# Patient Record
Sex: MALE | Race: OTHER | HISPANIC OR LATINO | URBAN - METROPOLITAN AREA
[De-identification: names, ages, dates, MRNs, and addresses within clinical notes are randomized per-mention and may not be internally consistent; named-entity substitution may affect disease eponyms.]

---

## 2023-06-19 ENCOUNTER — HOSPITAL ENCOUNTER (EMERGENCY)
Facility: HOSPITAL | Age: 33
Discharge: HOME | End: 2023-06-20
Attending: EMERGENCY MEDICINE

## 2023-06-19 VITALS
WEIGHT: 200.84 LBS | DIASTOLIC BLOOD PRESSURE: 65 MMHG | TEMPERATURE: 98.3 F | RESPIRATION RATE: 17 BRPM | SYSTOLIC BLOOD PRESSURE: 99 MMHG | OXYGEN SATURATION: 98 % | HEART RATE: 73 BPM

## 2023-06-19 DIAGNOSIS — M54.31 SCIATICA OF RIGHT SIDE: Primary | ICD-10-CM

## 2023-06-19 PROCEDURE — 99282 EMERGENCY DEPT VISIT SF MDM: CPT

## 2023-06-20 PROCEDURE — 96372 THER/PROPH/DIAG INJ SC/IM: CPT

## 2023-06-20 RX ORDER — DIAZEPAM 2 MG/1
2 TABLET ORAL ONCE
Status: COMPLETED | OUTPATIENT
Start: 2023-06-20 | End: 2023-06-20

## 2023-06-20 RX ORDER — KETOROLAC TROMETHAMINE 30 MG/ML
15 INJECTION, SOLUTION INTRAMUSCULAR; INTRAVENOUS ONCE
Status: COMPLETED | OUTPATIENT
Start: 2023-06-20 | End: 2023-06-20

## 2023-06-20 RX ORDER — METHOCARBAMOL 500 MG/1
500 TABLET, FILM COATED ORAL 2 TIMES DAILY
Qty: 20 TABLET | Refills: 0 | Status: SHIPPED | OUTPATIENT
Start: 2023-06-20

## 2023-06-20 RX ORDER — METHOCARBAMOL 500 MG/1
500 TABLET, FILM COATED ORAL ONCE
Status: COMPLETED | OUTPATIENT
Start: 2023-06-20 | End: 2023-06-20

## 2023-06-20 RX ADMIN — DIAZEPAM 2 MG: 2 TABLET ORAL at 00:38

## 2023-06-20 RX ADMIN — METHOCARBAMOL 500 MG: 500 TABLET ORAL at 00:38

## 2023-06-20 RX ADMIN — KETOROLAC TROMETHAMINE 15 MG: 30 INJECTION, SOLUTION INTRAMUSCULAR; INTRAVENOUS at 00:37

## 2023-06-20 NOTE — DISCHARGE INSTRUCTIONS
Your symptoms are likely due to a muscle strain / sciatica  Take ibuprofen and acetaminophen as needed for pain relief  You can alternate between these every 3 hours  You can also take Robaxin (muscle relaxer) 2 times each day as needed  Be aware this may make you drowsy  Please return to the ER for worsening symptoms, such as sudden incontinence or if you are unable to walk due to weakness

## 2023-06-20 NOTE — ED PROVIDER NOTES
"History  Chief Complaint   Patient presents with   • Leg Pain     Pt c/o right leg pain, that starts at the lower back and radiates down leg  States \"unsure if it is sciatica or not but it really hurts and it is hard to walk\"  Denies injury     58-year-old male presents for evaluation of low back pain radiating down his right leg  States this began gradually over the last several days, exacerbated with movement, able to ambulate but causes significant pain  Has tried acetaminophen and lidocaine patch without much relief  Does not have a known history of sciatica  States he was cleaning a lot over the weekend but no other obvious injury or trauma  Denies testicular/penile pain, penile discharge, dysuria; denies bowel/bladder incontinence, urinary retention, weakness, paresthesias, anesthesia; denies fever, chills, nausea, vomiting  None       No past medical history on file  Past Surgical History:   Procedure Laterality Date   • APPENDECTOMY      10 years ago       No family history on file  I have reviewed and agree with the history as documented  E-Cigarette/Vaping     E-Cigarette/Vaping Substances     Social History     Tobacco Use   • Smoking status: Never   • Smokeless tobacco: Never   Substance Use Topics   • Alcohol use: Not Currently   • Drug use: Never       Review of Systems   Constitutional: Negative for chills and fever  HENT: Negative for ear pain and sore throat  Eyes: Negative for pain and visual disturbance  Respiratory: Negative for cough and shortness of breath  Cardiovascular: Negative for chest pain and palpitations  Gastrointestinal: Negative for abdominal pain and vomiting  Genitourinary: Negative for dysuria and hematuria  Musculoskeletal: Positive for back pain and myalgias  Negative for arthralgias  Skin: Negative for color change and rash  Neurological: Negative for seizures and syncope  All other systems reviewed and are negative        Physical " Exam  Physical Exam  Vitals and nursing note reviewed  Constitutional:       General: He is in acute distress  Appearance: He is well-developed  He is not ill-appearing  HENT:      Head: Normocephalic and atraumatic  Eyes:      Conjunctiva/sclera: Conjunctivae normal    Cardiovascular:      Rate and Rhythm: Normal rate and regular rhythm  Heart sounds: No murmur heard  Pulmonary:      Effort: Pulmonary effort is normal  No respiratory distress  Breath sounds: Normal breath sounds  Abdominal:      Palpations: Abdomen is soft  Tenderness: There is no abdominal tenderness  Musculoskeletal:         General: No swelling  Cervical back: Normal and neck supple  Thoracic back: Normal       Lumbar back: Tenderness present  Decreased range of motion  Positive right straight leg raise test and positive left straight leg raise test    Skin:     General: Skin is warm and dry  Capillary Refill: Capillary refill takes less than 2 seconds  Neurological:      Mental Status: He is alert     Psychiatric:         Mood and Affect: Mood normal          Vital Signs  ED Triage Vitals   Temperature Pulse Respirations Blood Pressure SpO2   06/19/23 2218 06/19/23 2220 06/19/23 2220 06/19/23 2220 06/19/23 2220   98 3 °F (36 8 °C) 73 17 99/65 98 %      Temp Source Heart Rate Source Patient Position - Orthostatic VS BP Location FiO2 (%)   06/19/23 2218 06/19/23 2220 06/19/23 2220 06/19/23 2220 --   Oral Monitor Sitting Right arm       Pain Score       06/20/23 0037       10 - Worst Possible Pain           Vitals:    06/19/23 2220   BP: 99/65   Pulse: 73   Patient Position - Orthostatic VS: Sitting         Visual Acuity      ED Medications  Medications   ketorolac (TORADOL) injection 15 mg (15 mg Intramuscular Given 6/20/23 0037)   methocarbamol (ROBAXIN) tablet 500 mg (500 mg Oral Given 6/20/23 0038)   diazepam (VALIUM) tablet 2 mg (2 mg Oral Given 6/20/23 0038)       Diagnostic Studies  Results Reviewed     None                 No orders to display              Procedures  Procedures         ED Course                                             Medical Decision Making  40-year-old male presents for evaluation of low back pain radiating into right leg  Exam: Patient appears very uncomfortable, positive bilateral straight leg raise  Neurovascularly intact  Symptoms likely due to musculoskeletal low back pain and sciatica  No red flag symptoms concerning for spinal abscess or cauda equina  Symptoms are unilateral   Will treat symptomatically with Toradol, Robaxin, Valium  Patient has already tried Tylenol and lidocaine patches  Patient states his symptoms have improved from 11/10 to 7/10  Was able to stand up and ambulate without significant difficulty  Patient is agreeable to discharge home  Recommend NSAIDs/Tylenol, Robaxin, heating pad as needed for pain relief  Follow-up with PCP and return to ER if condition worsens  Patient expresses understanding of the condition, treatment plan, follow-up instructions, and return precautions  Risk  Prescription drug management  Disposition  Final diagnoses:   Sciatica of right side     Time reflects when diagnosis was documented in both MDM as applicable and the Disposition within this note     Time User Action Codes Description Comment    6/20/2023  1:18 AM Fatmata Rm Add [M54 31] Sciatica of right side       ED Disposition     ED Disposition   Discharge    Condition   Stable    Date/Time   Tue Jun 20, 2023  1:18 AM    Comment   [address] discharge to home/self care                 Follow-up Information     Follow up With Specialties Details Why Contact Info Additional [address] Emergency Department Emergency Medicine  If symptoms worsen [place] [phone] [address] [facility] Emergency Department, [address] [facility] [address] , [place], [place], [address]          Discharge Medication List as of 6/20/2023  1:21 AM      START taking these medications    Details   methocarbamol (ROBAXIN) 500 mg tablet Take 1 tablet (500 mg total) by mouth 2 (two) times a day, Starting Tue 6/20/2023, Normal             No discharge procedures on file      PDMP Review     None          ED Provider  Electronically Signed by           Toribio Donahue PA-C  06/20/23 0255